# Patient Record
Sex: MALE | Race: WHITE | URBAN - METROPOLITAN AREA
[De-identification: names, ages, dates, MRNs, and addresses within clinical notes are randomized per-mention and may not be internally consistent; named-entity substitution may affect disease eponyms.]

---

## 2023-07-06 ENCOUNTER — OFFICE VISIT (OUTPATIENT)
Dept: FAMILY MEDICINE CLINIC | Facility: CLINIC | Age: 28
End: 2023-07-06

## 2023-07-06 VITALS
WEIGHT: 169.38 LBS | SYSTOLIC BLOOD PRESSURE: 102 MMHG | HEART RATE: 77 BPM | DIASTOLIC BLOOD PRESSURE: 62 MMHG | HEIGHT: 70 IN | BODY MASS INDEX: 24.25 KG/M2

## 2023-07-06 DIAGNOSIS — Z00.00 ANNUAL PHYSICAL EXAM: Primary | ICD-10-CM

## 2023-07-06 DIAGNOSIS — Z13.1 DIABETES MELLITUS SCREENING: ICD-10-CM

## 2023-07-06 DIAGNOSIS — L84 CALLUS OF FOOT: ICD-10-CM

## 2023-07-06 DIAGNOSIS — Z30.09 VASECTOMY EVALUATION: ICD-10-CM

## 2023-07-06 DIAGNOSIS — T78.40XA ALLERGIC REACTION, INITIAL ENCOUNTER: ICD-10-CM

## 2023-07-06 DIAGNOSIS — Z13.220 LIPID SCREENING: ICD-10-CM

## 2023-07-06 PROCEDURE — 3008F BODY MASS INDEX DOCD: CPT | Performed by: FAMILY MEDICINE

## 2023-07-06 PROCEDURE — 99203 OFFICE O/P NEW LOW 30 MIN: CPT | Performed by: FAMILY MEDICINE

## 2023-07-06 PROCEDURE — 99385 PREV VISIT NEW AGE 18-39: CPT | Performed by: FAMILY MEDICINE

## 2023-07-06 PROCEDURE — 3074F SYST BP LT 130 MM HG: CPT | Performed by: FAMILY MEDICINE

## 2023-07-06 PROCEDURE — 3078F DIAST BP <80 MM HG: CPT | Performed by: FAMILY MEDICINE

## 2023-07-06 RX ORDER — ALBUTEROL SULFATE 1.25 MG/3ML
1 SOLUTION RESPIRATORY (INHALATION) EVERY 6 HOURS PRN
COMMUNITY

## 2023-07-08 ENCOUNTER — LAB ENCOUNTER (OUTPATIENT)
Dept: LAB | Age: 28
End: 2023-07-08
Attending: FAMILY MEDICINE
Payer: COMMERCIAL

## 2023-07-08 DIAGNOSIS — Z13.1 DIABETES MELLITUS SCREENING: ICD-10-CM

## 2023-07-08 DIAGNOSIS — Z13.220 LIPID SCREENING: ICD-10-CM

## 2023-07-08 LAB
ALBUMIN SERPL-MCNC: 4.1 G/DL (ref 3.4–5)
ALBUMIN/GLOB SERPL: 1.2 {RATIO} (ref 1–2)
ALP LIVER SERPL-CCNC: 85 U/L
ALT SERPL-CCNC: 30 U/L
ANION GAP SERPL CALC-SCNC: 8 MMOL/L (ref 0–18)
AST SERPL-CCNC: 22 U/L (ref 15–37)
BILIRUB SERPL-MCNC: 0.4 MG/DL (ref 0.1–2)
BUN BLD-MCNC: 21 MG/DL (ref 7–18)
BUN/CREAT SERPL: 20 (ref 10–20)
CALCIUM BLD-MCNC: 8.9 MG/DL (ref 8.5–10.1)
CHLORIDE SERPL-SCNC: 107 MMOL/L (ref 98–112)
CHOLEST SERPL-MCNC: 190 MG/DL (ref ?–200)
CO2 SERPL-SCNC: 25 MMOL/L (ref 21–32)
CREAT BLD-MCNC: 1.05 MG/DL
EST. AVERAGE GLUCOSE BLD GHB EST-MCNC: 111 MG/DL (ref 68–126)
FASTING PATIENT LIPID ANSWER: YES
FASTING STATUS PATIENT QL REPORTED: YES
GFR SERPLBLD BASED ON 1.73 SQ M-ARVRAT: 99 ML/MIN/1.73M2 (ref 60–?)
GLOBULIN PLAS-MCNC: 3.4 G/DL (ref 2.8–4.4)
GLUCOSE BLD-MCNC: 93 MG/DL (ref 70–99)
HBA1C MFR BLD: 5.5 % (ref ?–5.7)
HDLC SERPL-MCNC: 64 MG/DL (ref 40–59)
LDLC SERPL CALC-MCNC: 109 MG/DL (ref ?–100)
NONHDLC SERPL-MCNC: 126 MG/DL (ref ?–130)
OSMOLALITY SERPL CALC.SUM OF ELEC: 293 MOSM/KG (ref 275–295)
POTASSIUM SERPL-SCNC: 4 MMOL/L (ref 3.5–5.1)
PROT SERPL-MCNC: 7.5 G/DL (ref 6.4–8.2)
SODIUM SERPL-SCNC: 140 MMOL/L (ref 136–145)
TRIGL SERPL-MCNC: 93 MG/DL (ref 30–149)
VLDLC SERPL CALC-MCNC: 16 MG/DL (ref 0–30)

## 2023-07-08 PROCEDURE — 36415 COLL VENOUS BLD VENIPUNCTURE: CPT

## 2023-07-08 PROCEDURE — 80053 COMPREHEN METABOLIC PANEL: CPT

## 2023-07-08 PROCEDURE — 83036 HEMOGLOBIN GLYCOSYLATED A1C: CPT

## 2023-07-08 PROCEDURE — 80061 LIPID PANEL: CPT

## 2023-08-09 ENCOUNTER — OFFICE VISIT (OUTPATIENT)
Dept: SURGERY | Facility: CLINIC | Age: 28
End: 2023-08-09

## 2023-08-09 DIAGNOSIS — Z30.09 FAMILY PLANNING: Primary | ICD-10-CM

## 2023-08-09 PROCEDURE — 99203 OFFICE O/P NEW LOW 30 MIN: CPT | Performed by: UROLOGY

## 2023-08-09 NOTE — PROGRESS NOTES
Conrado Severs, MD  Department of Urology  [facility], [place]    T: [phone]  F: [phone]    To: Masood Apple DO   [facility] [address] [id number]    Re: [place]   MRN: [id number]  : 5/10/1995    Dear Masood Apple DO,    Today I had the pleasure of seeing [place] in my clinic. As you know, Mr. Deneen Grant is a pleasant 33year old year old male who I am seeing for vasectomy cosult. Patient was last seen in this department on Visit date not found. Briefly, patient is engaged with 2 twin boys. He and his partner desire vasectomy as a means of contraception       PAST MEDICAL HISTORY:  Past Medical History:   Diagnosis Date    Allergic rhinitis Childhood    Asthma Childhood        PAST SURGICAL HISTORY:  Past Surgical History:   Procedure Laterality Date    OTHER SURGICAL HISTORY  6-8 years ago    Torn labrum on left shoulder. Also torn flexur tendon in left wrist a ear or 2 prior to labrum surgery.     TONSILLECTOMY  Childhood         ALLERGIES:    Fish-Derived Produc*    SHORTNESS OF BREATH  Mold                    RESPIRATORY FAILURE  Shellfish               OTHER (SEE COMMENTS)    Comment:Difficulty breathing      MEDICATIONS:  Current Outpatient Medications   Medication Instructions    Albuterol Sulfate 1.25 MG/3ML Inhalation Nebu Soln 1 ampule, Nebulization, Every 6 hours PRN        FAMILY HISTORY:  Family History   Problem Relation Age of Onset    Asthma Father     Colon Cancer Neg     Prostate Cancer Neg         SOCIAL HISTORY:  Social History     Socioeconomic History    Marital status: Unknown   Tobacco Use    Smoking status: Former     Packs/day: 0.50     Years: 6.00     Pack years: 3.00     Types: Cigarettes     Quit date: 2021     Years since quittin.7     Passive exposure: Past    Smokeless tobacco: Never    Tobacco comments:     Chews nicotine pouches    Vaping Use    Vaping Use: Never used Substance and Sexual Activity    Alcohol use: Yes     Alcohol/week: 12.0 standard drinks of alcohol     Types: 12 Cans of beer per week    Drug use: Not Currently     Frequency: 0.5 times per week     Types: Cannabis    Sexual activity: Yes     Partners: Female          PHYSICAL EXAMINATION:  There were no vitals filed for this visit. CONSTITUTIONAL: No apparent distress, cooperative and communicative  NEUROLOGIC: Alert and oriented   HEAD: Normocephalic, atraumatic   EYES: Sclera non-icteric   ENT: Hearing intact, moist mucous membranes   NECK: No obvious goiter or masses   RESPIRATORY: Normal respiratory effort, Nonlabored breathing on room air  SKIN: No evident rashes   ABDOMEN: Soft, nontender, nondistended, no rebound tenderness, no guarding, no masses  GENITOURINARY: Bilateral vasa palpated  DIGITAL RECTAL EXAM: Did not perform    REVIEW OF SYSTEMS:    A comprehensive 10-point review of systems was completed. Pertinent positives and negatives are noted in the the HPI. LABORATORY DATA:    none        IMAGING REVIEW:  none     OTHER RELEVANT DATA:   none     IMPRESSION: Vasectomy, proceed    We discussed that a vasectomy is intended to be a permanent form of contraception and that if he desires fertility after vasectomy, options included vasectomy reversal and sperm retrieval with possible in vitro fertilization. These options are not always successful and may be very expensive. We also discussed the risks of vasectomy which included symptomatic hematoma and infection (1-2%), chronic scrotal pain (6%; caused by congestive epididymitis, sperm granuloma and infective epididymoorchitis) need for repeat vasectomy (<1% of cases), need for additional procedures, vasectomy failure, and pregnancy (1 in 2000 men). The chronic scrotal discomfort may be no more than a low-grade chronic ache that causes little disability and requires only symptomatic treatment.  However, a proportion of patients will be sufficiently debilitated to seek epididymectomy or even orchidectomy (removal of the epididymis and or testicle). Furthermore, for a very small number of patients, even this radical surgery will not provide relief from their scrotal discomfort. We discussed that he will have two small incisions in his scrotum with dissolvable sutures. He was told that vasectomy does NOT produce immediate sterility and that following vasectomy, another form of contraception is required until vas occlusion is confirmed by post vasectomy semen analysis. Post vasectomy semenanalysis will be obtained 12-16 weeks after the vasectomy. He was told that he may stop using other methods of contraception when examination of one well-mixed, uncentrifuged, fresh post-vasectomy semen specimen shows azoospermia or only rare non-motile sperm. He was once again told that even after vas occlusion is confirmed, vasectomy is not 100% reliable in preventing pregnancy and that the risk of pregnancy after vasectomy is approximately 1 in 2,000 men who have post-vasectomy azoospermia or semenanalysis showing rare non-motile sperm. The reasons are very early recanalization of the vas deferens or the presence of an accessory vas unrecognized at the time of surgery. There have also been cases of DNA-confirmed paternity despite documented azoospermia before and after conception. He was told to refrain from ejaculation for approximately one week after vasectomy. If there are >100,000 non-motile sperm/mL after 6 months post vasectomy, we would trend the semenanalysis for consideration of repeat vasectomy. PLAN:  Vasectomy, proceed    Thank you for referring this very pleasant patient to my clinic. If you have any questions or concerns, please do not hesitate to contact me.     Sincerely,  Dionicio Riley MD    30 minutes were spent on this patient at this visit obtaining a history, reviewing medical records, developing a treatment plan, counseling and discussing treatment strategy with patient, coordination of care and documentation. The [id number] makes medical notes available to patients in the interest of transparency. However, please be advised that this is a medical document. It is intended as a peer to peer communication. It is written in medical language and may contain abbreviations or verbiage that are technical and unfamiliar. It may appear blunt or direct. Medical documents are intended to carry relevant information, facts as evident, and the clinical opinion of the practitioner.

## 2023-09-20 ENCOUNTER — PROCEDURE (OUTPATIENT)
Dept: SURGERY | Facility: CLINIC | Age: 28
End: 2023-09-20

## 2023-09-20 VITALS — HEART RATE: 93 BPM | DIASTOLIC BLOOD PRESSURE: 78 MMHG | SYSTOLIC BLOOD PRESSURE: 116 MMHG

## 2023-09-20 DIAGNOSIS — Z30.2 ENCOUNTER FOR VASECTOMY: Primary | ICD-10-CM

## 2023-09-20 PROCEDURE — 3074F SYST BP LT 130 MM HG: CPT | Performed by: UROLOGY

## 2023-09-20 PROCEDURE — 3078F DIAST BP <80 MM HG: CPT | Performed by: UROLOGY

## 2023-09-20 PROCEDURE — 55250 REMOVAL OF SPERM DUCT(S): CPT | Performed by: UROLOGY

## 2023-09-20 NOTE — PROCEDURES
BILATERAL VASECTOMY     PRE-OP DIAGNOSIS: Family Planning/Desires Male Sterility     POST-OP DIAGNOSIS: Same     PROCEDURE:  1. Bilateral vasectomy     SURGEON: Paulina Saucedo MD     ASSISTANT: none     EBL: minimal     ANESTHESIA: Local, Lidocaine 1%, 10cc     SPECIMENS:  1. RIGHT segment of vas  2. LEFT segment of vas     INDICATIONS: 33year-old   man with  children who desires sterility with bilateral vasectomy. We discussed that a vasectomy is intended to be a permanent form of contraception and that if he desires fertility after vasectomy, options included vasectomy reversal and sperm retrieval with possible in vitro fertilization. These options are not always successful and may be very expensive. We also discussed the risks of vasectomy which included symptomatic hematoma and infection (1-2%), chronic scrotal pain (6%; caused by congestive epididymitis, sperm granuloma and infective epididymoorchitis) need for repeat vasectomy (<1% of cases), need for additional procedures, vasectomy failure, and pregnancy (1 in 2000 men). The chronic scrotal discomfort may be no more than a low-grade chronic ache that causes little disability and requires only symptomatic treatment. However, a proportion of patients will be sufficiently debilitated to seek epididymectomy or even orchidectomy (removal of the epididymis and or testicle). Furthermore, for a very small number of patients, even this radical surgery will not provide relief from their scrotal discomfort. We discussed that he will have two small incisions in his scrotum with dissolvable sutures. He was told that vasectomy does NOT produce immediate sterility and that following vasectomy, another form of contraception is required until vas occlusion is confirmed by post vasectomy semen analysis. Post vasectomy semenanalysis will be obtained 12-16 weeks after the vasectomy.  He was told that he may stop using other methods of contraception when examination of one well-mixed, uncentrifuged, fresh post-vasectomy semen specimen shows azoospermia or only rare non-motile sperm. He was once again told that even after vas occlusion is confirmed, vasectomy is not 100% reliable in preventing pregnancy and that the risk of pregnancy after vasectomy is approximately 1 in 2,000 men who have post-vasectomy azoospermia or semenanalysis showing rare non-motile sperm. The reasons are very early recanalization of the vas deferens or the presence of an accessory vas unrecognized at the time of surgery. There have also been cases of DNA-confirmed paternity despite documented azoospermia before and after conception. He was told to refrain from ejaculation for approximately one week after vasectomy. If there are >100,000 non-motile sperm/mL after 6 months post vasectomy, we would trend the semenanalysis for consideration of repeat vasectomy. PROCEDURE: After the appropriate time out checklist was performed, and it was confirmed that the patient was in fact the patient here for vasectomy, the scrotum was prepped and draped in an appropriate fashion. The right vas was then grasped and brought up underneath the skin surface. The skin was injected with lidocaine to make a small wheel. We then anesthestized the skin, dartose muscle and the vasal sheath with 5cc of lidocaine 1%. After an appropriate time for that to set, we used a 15 blade scalpel to make a small incision overlying the vas in the right hemiscrotum. A snap was used to spread the tissue overlying the vas to make room for the vas ring forceps. The ring forceps was used to grasp the vas deferens to hold it in place. We used the 15 blade to cut away at the vasal sheath and used towel clamps to just grasp the vas deferens. Once we were able to isolate just the vas deferens, we used a small mosquito clamp to push down the vasal sheath.  Two snaps were applied to the vas - one at the testicular end and another at the abdominal end. The vas was transected over the snaps and a 1-2cm portion of vas was removed. The needle tip electrocautery was inserted first nenita the abdominal end and then into the testicular end for about 1.5cm. By slowly pulling back and firing it, you could see that the mucosa was being cauterized. This was not a full-thickness cauterization, but only a mucosal cauterization. Clips were placed on the testicular and abdominal ends of the vas stumps. There was no bleeding seen from the vasal stumps, and they were dropped back into the left hemiscrotum. The dartos muscle and skin were cauterized. An exactly similar procedure was performed on the LEFT side. Again, mucosal cauterization was undertaken. No bleeding was seen. Again, closure was the same. The skin was approximated with 3-0 chromic sutures in a horizontal mattress fashion. Fluffs gauze and scrotal support were placed. DISPOSITION: home     FOLLOW-UP: Post vasectomy semen analysis 8-16 weeks after vasectomy. Patient knows to use another form of contraception until vasal occlusion is confirmed by post-vasectomy semen analysis.

## 2023-10-04 ENCOUNTER — OFFICE VISIT (OUTPATIENT)
Dept: ALLERGY | Facility: CLINIC | Age: 28
End: 2023-10-04

## 2023-10-04 ENCOUNTER — NURSE ONLY (OUTPATIENT)
Dept: ALLERGY | Facility: CLINIC | Age: 28
End: 2023-10-04

## 2023-10-04 VITALS
BODY MASS INDEX: 24.2 KG/M2 | WEIGHT: 169 LBS | DIASTOLIC BLOOD PRESSURE: 73 MMHG | OXYGEN SATURATION: 98 % | HEART RATE: 86 BPM | SYSTOLIC BLOOD PRESSURE: 118 MMHG | HEIGHT: 70 IN

## 2023-10-04 DIAGNOSIS — J30.89 SEASONAL AND PERENNIAL ALLERGIC RHINOCONJUNCTIVITIS: Primary | ICD-10-CM

## 2023-10-04 DIAGNOSIS — J30.2 SEASONAL AND PERENNIAL ALLERGIC RHINOCONJUNCTIVITIS: Primary | ICD-10-CM

## 2023-10-04 DIAGNOSIS — J45.20 MILD INTERMITTENT EXTRINSIC ASTHMA WITHOUT COMPLICATION: ICD-10-CM

## 2023-10-04 DIAGNOSIS — Z92.29 COVID-19 VACCINE SERIES COMPLETED: ICD-10-CM

## 2023-10-04 DIAGNOSIS — J30.89 ENVIRONMENTAL AND SEASONAL ALLERGIES: Primary | ICD-10-CM

## 2023-10-04 DIAGNOSIS — Z91.018 FOOD ALLERGY: ICD-10-CM

## 2023-10-04 DIAGNOSIS — Z23 FLU VACCINE NEED: ICD-10-CM

## 2023-10-04 DIAGNOSIS — H10.10 SEASONAL AND PERENNIAL ALLERGIC RHINOCONJUNCTIVITIS: Primary | ICD-10-CM

## 2023-10-04 PROCEDURE — 95024 IQ TESTS W/ALLERGENIC XTRCS: CPT | Performed by: ALLERGY & IMMUNOLOGY

## 2023-10-04 PROCEDURE — 3074F SYST BP LT 130 MM HG: CPT | Performed by: ALLERGY & IMMUNOLOGY

## 2023-10-04 PROCEDURE — 3008F BODY MASS INDEX DOCD: CPT | Performed by: ALLERGY & IMMUNOLOGY

## 2023-10-04 PROCEDURE — 95004 PERQ TESTS W/ALRGNC XTRCS: CPT | Performed by: ALLERGY & IMMUNOLOGY

## 2023-10-04 PROCEDURE — 3078F DIAST BP <80 MM HG: CPT | Performed by: ALLERGY & IMMUNOLOGY

## 2023-10-04 PROCEDURE — 99204 OFFICE O/P NEW MOD 45 MIN: CPT | Performed by: ALLERGY & IMMUNOLOGY

## 2023-10-04 RX ORDER — ALBUTEROL SULFATE 90 UG/1
2 AEROSOL, METERED RESPIRATORY (INHALATION) EVERY 6 HOURS PRN
Qty: 1 EACH | Refills: 0 | Status: SHIPPED | OUTPATIENT
Start: 2023-10-04

## 2023-10-04 RX ORDER — EPINEPHRINE 0.3 MG/.3ML
INJECTION SUBCUTANEOUS
Qty: 1 EACH | Refills: 0 | Status: SHIPPED | OUTPATIENT
Start: 2023-10-04

## 2023-10-04 RX ORDER — LEVOCETIRIZINE DIHYDROCHLORIDE 5 MG/1
5 TABLET, FILM COATED ORAL EVERY EVENING
Qty: 90 TABLET | Refills: 1 | Status: SHIPPED | OUTPATIENT
Start: 2023-10-04

## 2023-10-04 RX ORDER — MONTELUKAST SODIUM 10 MG/1
10 TABLET ORAL
COMMUNITY

## 2023-10-04 NOTE — PATIENT INSTRUCTIONS
#1 allergic rhinitis  See above skin testing to screen for allergic triggers  Reviewed avoidance measures and potential treatment options immunotherapy  Trial of Xyzal, levocetirizine 5 mg once a day up to twice a day if needed if having significant runny nose sneezing itchy watery eyes  May add Flonase or Nasacort 2 sprays per nostril once a day if having prominent nasal congestion or postnasal drip      #2 asthma  Appears mild intermittent by history. No ED visits or prednisone in the past year. Denies symptoms more than 2 days/week  Albuterol 2 puffs every 4-6 hours if having active coughing wheezing or shortness of breath    #3 Food allergies  History of seafood allergy  See above skin testing to finned fish and shellfish to further evaluate  Recommend avoid those foods are positive on skin testing  May consider oral challenge those foods are negative on skin testing  EpiPen and Benadryl as needed based on symptom severity and event of allergic reaction  EpiPen prescription sent and teaching provided    #4 COVID vaccines reviewed. Recommend booster this fall    #5 flu vaccine recommended this fall     Orders This Visit:  No orders of the defined types were placed in this encounter. Meds This Visit:  Requested Prescriptions     Signed Prescriptions Disp Refills    levocetirizine 5 MG Oral Tab 90 tablet 1     Sig: Take 1 tablet (5 mg total) by mouth every evening.     EPINEPHrine (EPIPEN 2-ARMEN) 0.3 MG/0.3ML Injection Solution Auto-injector 1 each 0     Sig: Inject IM in event of  allergic reaction

## 2023-10-04 NOTE — PROGRESS NOTES
Kevin Lang is a 33year old male. HPI:   Patient presents with: Allergies: Patient states this time of year,in the spring and/or fall gets a puffy face, has breathing issues - has asthma issues, states he has been in the ER twice in the past 3-4 years. Patient is a 78-year-old male who presents for allergy consultation upon referral of his PCP with a chief complaint of allergies referral from July 6, 2023 reviewed and appreciated. PCP notes history of allergic rhinitis that can worsen in the spring and fall. Above nursing notes reviewed and appreciated    Today patient reports      Allergies   Duration:  Timing: spring and fall   Symptoms: face feels like plastic red,  limbs feels heavy, loss of strengths, sob   Freq 1x/year   No gen hives  + rn, sz ,Ie we   Severity: moderate   Status:  no change   Triggers: allergies   Tried:benadryl negative besides Benadryl, pred   Pets or smokers:  none   Former smoker of cigarettes. Quit in 2021    Hx of asthma, ad, or food allergy:    History of asthma. Denies current asthma symptoms or asthma symptoms more than 2 days/week. No ED visits or prednisone in the past year. No history of intubations  Exercise induced     History of seafood allergy. Request for testing to further evaluate. Child rash       COVID-vaccine x2 doses last in August 2021    HISTORY:  Past Medical History:   Diagnosis Date    Allergic rhinitis Childhood    Asthma Childhood      Past Surgical History:   Procedure Laterality Date    OTHER SURGICAL HISTORY  6-8 years ago    Torn labrum on left shoulder. Also torn flexur tendon in left wrist a ear or 2 prior to labrum surgery.     TONSILLECTOMY  Childhood      Family History   Problem Relation Age of Onset    Asthma Father     Allergies Father     Colon Cancer Neg     Prostate Cancer Neg       Social History:   Social History     Socioeconomic History    Marital status: Unknown   Tobacco Use    Smoking status: Former     Packs/day: 0.50     Years: 6.00     Additional pack years: 0.00     Total pack years: 3.00     Types: Cigarettes     Quit date: 2021     Years since quittin.8     Passive exposure: Past    Smokeless tobacco: Never    Tobacco comments:     Chews nicotine pouches    Vaping Use    Vaping Use: Never used   Substance and Sexual Activity    Alcohol use: Yes     Alcohol/week: 12.0 standard drinks of alcohol     Types: 12 Cans of beer per week    Drug use: Not Currently     Frequency: 0.5 times per week     Types: Cannabis    Sexual activity: Yes     Partners: Female        Medications (Active prior to today's visit):  Current Outpatient Medications   Medication Sig Dispense Refill    levocetirizine 5 MG Oral Tab Take 1 tablet (5 mg total) by mouth every evening. 90 tablet 1    EPINEPHrine (EPIPEN 2-ARMEN) 0.3 MG/0.3ML Injection Solution Auto-injector Inject IM in event of  allergic reaction 1 each 0    albuterol (PROAIR HFA) 108 (90 Base) MCG/ACT Inhalation Aero Soln Inhale 2 puffs into the lungs every 6 (six) hours as needed for Wheezing. 1 each 0    montelukast 10 MG Oral Tab Take 1 tablet (10 mg total) by mouth daily as needed. (Patient not taking: Reported on 10/4/2023)      Albuterol Sulfate 1.25 MG/3ML Inhalation Nebu Soln Take 3 mL (1.25 mg total) by nebulization every 6 (six) hours as needed for Wheezing.  (Patient not taking: Reported on 10/4/2023)         Allergies:    Fish-Derived Produc*    SHORTNESS OF BREATH  Mold                    RESPIRATORY FAILURE  Shellfish               OTHER (SEE COMMENTS)    Comment:Difficulty breathing      ROS:     Allergic/Immuno:  See HPI  Cardiovascular:  Negative for irregular heartbeat/palpitations, chest pain, edema  Constitutional:  Negative night sweats,weight loss, irritability and lethargy  Endocrine:  Negative for cold intolerance, polydipsia and polyphagia  ENMT:  Negative for ear drainage, hearing loss  see hpi   Eyes:  Negative for eye discharge and vision loss  Gastrointestinal:  Negative for abdominal pain, diarrhea and vomiting  Genitourinary:  Negative for dysuria and hematuria  Hema/Lymph:  Negative for easy bleeding and easy bruising  Integumentary:  Negative for pruritus and rash  Musculoskeletal:  Negative for joint symptoms  Neurological:  Negative for dizziness, seizures  Psychiatric:  Negative for inappropriate interaction and psychiatric symptoms  Respiratory:  Negative for cough, dyspnea and wheezing      PHYSICAL EXAM:   Constitutional: responsive, no acute distress noted  Head/Face: NC/Atraumatic  Eyes/Vision: conjunctiva and lids are normal extraocular motion is intact   Ears/Audiometry: tympanic membranes are normal bilaterally hearing is grossly intact  Nose/Mouth/Throat: nose and throat are clear mucous membranes are moist   Neck/Thyroid: neck is supple without adenopathy  Lymphatic: no abnormal cervical, supraclavicular or axillary adenopathy is noted  Respiratory: normal to inspection lungs are clear to auscultation bilaterally normal respiratory effort   Cardiovascular: regular rate and rhythm no murmurs, gallups, or rubs  Abdomen: soft non-tender non-distended  Skin/Hair: no unusual rashes present  Extremities: no edema, cyanosis, or clubbing  Neurological:Oriented to time, place, person & situation       ASSESSMENT/PLAN:   Assessment   Seasonal and perennial allergic rhinoconjunctivitis  (primary encounter diagnosis)  Mild intermittent extrinsic asthma without complication  [id number] vaccine series completed  Flu vaccine need  Food allergy    Skin testing today to common indoor and outdoor environmental allergies was positive to trees grass ragweed weeds mold dust mite cats and dogs    Skin testing today to fish and shellfish panel was negative      #1 allergic rhinitis  See above skin testing to screen for allergic triggers  Reviewed avoidance measures and potential treatment options immunotherapy  Trial of Xyzal, levocetirizine 5 mg once a day up to twice a day if needed if having significant runny nose sneezing itchy watery eyes  May add Flonase or Nasacort 2 sprays per nostril once a day if having prominent nasal congestion or postnasal drip      #2 asthma  Appears mild intermittent by history. No ED visits or prednisone in the past year. Denies symptoms more than 2 days/week  Albuterol 2 puffs every 4-6 hours if having active coughing wheezing or shortness of breath    #3 Food allergies  History of seafood allergy  See above skin testing to finned fish and shellfish to further evaluate  Recommend avoid those foods are positive on skin testing  May consider oral challenge those foods are negative on skin testing  EpiPen and Benadryl as needed based on symptom severity and event of allergic reaction  EpiPen prescription sent and teaching provided    #4 COVID vaccines reviewed. Recommend booster this fall    #5 flu vaccine recommended this fall     Orders This Visit:  No orders of the defined types were placed in this encounter. Meds This Visit:  Requested Prescriptions     Signed Prescriptions Disp Refills    levocetirizine 5 MG Oral Tab 90 tablet 1     Sig: Take 1 tablet (5 mg total) by mouth every evening. EPINEPHrine (EPIPEN 2-ARMEN) 0.3 MG/0.3ML Injection Solution Auto-injector 1 each 0     Sig: Inject IM in event of  allergic reaction    albuterol (PROAIR HFA) 108 (90 Base) MCG/ACT Inhalation Aero Soln 1 each 0     Sig: Inhale 2 puffs into the lungs every 6 (six) hours as needed for Wheezing. Imaging & Referrals:  None     10/4/2023  Julio Harrison MD      If medication samples were provided today, they were provided solely for patient education and training related to self administration of these medications. Teaching, instruction and sample was provided to the patient by myself. Teaching included  a review of potential adverse side effects as well as potential efficacy.   Patient's questions were answered in regards to medication administration and dosing and potential side effects.  Teaching was provided via the teach back method

## 2023-12-02 RX ORDER — ALBUTEROL SULFATE 90 UG/1
2 AEROSOL, METERED RESPIRATORY (INHALATION) EVERY 6 HOURS PRN
Qty: 1 EACH | Refills: 0 | Status: SHIPPED | OUTPATIENT
Start: 2023-12-02

## 2023-12-02 NOTE — TELEPHONE ENCOUNTER
Patient returning nurse phone call   Verified name and   Per patient has had a cold for a little more than a month  Was using albuterol 4-5 times a day last month when cold was at it's worse  Per patient symptoms have mostly resolved - still with slight cough, coughs up phlegm sometimes   Patient is not sure if phlegm is colored  No fever, body aches or chills  Receives good relief after using inhaler   Now is only having to use albuterol inhaler once a week  Is not using inhaler sooner than 4 hours  RN advised that if patient is having to use inhaler sooner than 4 hours or is not receiving good relief to go to ER/urgent care  Patient verbalized understanding  Refill filled per protocol

## 2023-12-02 NOTE — TELEPHONE ENCOUNTER
Left a message for patient to please contact our office to discuss Albuterol inhaler refill. Last office visit was 10/4/23 and last refill was also 10/423. Will need to assess patient's asthma.

## 2023-12-23 ENCOUNTER — LAB ENCOUNTER (OUTPATIENT)
Dept: LAB | Facility: HOSPITAL | Age: 28
End: 2023-12-23
Attending: UROLOGY
Payer: COMMERCIAL

## 2023-12-23 DIAGNOSIS — Z30.2 ENCOUNTER FOR VASECTOMY: ICD-10-CM

## 2023-12-23 PROCEDURE — 89321 SEMEN ANAL SPERM DETECTION: CPT

## 2024-02-05 ENCOUNTER — TELEPHONE (OUTPATIENT)
Dept: CASE MANAGEMENT | Age: 29
End: 2024-02-05

## 2024-02-05 DIAGNOSIS — S05.00XA CORNEAL ABRASION, UNSPECIFIED LATERALITY, INITIAL ENCOUNTER: Primary | ICD-10-CM

## 2024-02-05 NOTE — TELEPHONE ENCOUNTER
Dr. Hernandez,     Patient called requesting referral to an [facility] ophthalmologist for scratched cornea.     Pended referral please review diagnosis and sign off if you agree.    Thank you.  Maggie Fletcher  [facility]

## 2024-02-12 ENCOUNTER — DOCUMENTATION ONLY (OUTPATIENT)
Dept: FAMILY MEDICINE CLINIC | Facility: CLINIC | Age: 29
End: 2024-02-12

## 2024-02-12 ENCOUNTER — MED REC SCAN ONLY (OUTPATIENT)
Dept: FAMILY MEDICINE CLINIC | Facility: CLINIC | Age: 29
End: 2024-02-12

## 2024-12-27 NOTE — PROGRESS NOTES
HPI:    Patient ID: Franco Esqueda is a 29 year old male.    HPI  Pt presenting for routine physical exam. Denies any acute issues or recent illnesses. No significant chronic medical problems. Past medical/surgical history, family history, and social history were reviewed.     Notes lump on Left medial wrist  Frequent numbness/tingling in fingers  No weakness    Trying to work on wellness and optimal performance  Plans to increase protein intake to goal 115g daily  Cold rolled oats QAM  Yogurt, jam  Otherwise balanced diet  Grain, +/- veg  Started fish oil, creatinine    Twins 2yo sons  Franco nogueira  [employer]    Mood stable, denies SH/SI/HI      Review of Systems   A comprehensive 10 point review of systems was completed.  Pertinent positives and negatives noted in the the HPI.       Current Outpatient Medications   Medication Sig Dispense Refill    Omega-3 Fatty Acids (FISH OIL OR) Take by mouth.      Creatine Does not apply Powder       WELLNESS PROTEIN SHAKE OR Take by mouth.      fluticasone-salmeterol 115-21 MCG/ACT Inhalation Aerosol Inhale 1 puff into the lungs 2 (two) times daily. 1 each 3    EPINEPHrine (EPIPEN 2-ARMEN) 0.3 MG/0.3ML Injection Solution Auto-injector Inject IM in event of  allergic reaction 1 each 0    ALPRAZolam (XANAX) 0.25 MG Oral Tab Take 1 tablet (0.25 mg total) by mouth nightly as needed for Anxiety or Sleep. Can cause sedation/ fatigue. 30 tablet 0    albuterol (PROAIR HFA) 108 (90 Base) MCG/ACT Inhalation Aero Soln Inhale 2 puffs into the lungs every 6 (six) hours as needed for Wheezing. 1 each 0    levocetirizine 5 MG Oral Tab Take 1 tablet (5 mg total) by mouth every evening. 90 tablet 1    montelukast 10 MG Oral Tab Take 1 tablet (10 mg total) by mouth daily as needed. (Patient not taking: Reported on 12/27/2024)       Allergies:Allergies[1]   Vitals:    12/27/24 1416   BP: 123/79   Pulse: 83   Weight: 156 lb 12.8 oz (71.1 kg)   Height: 5' 10\" (1.778 m)       Body mass index  is 22.5 kg/m².   PHYSICAL EXAM:   Physical Exam  Vitals reviewed.   Constitutional:       General: He is not in acute distress.     Appearance: Normal appearance.   HENT:      Head: Normocephalic and atraumatic.      Right Ear: External ear normal. There is impacted cerumen.      Left Ear: External ear normal. There is impacted cerumen.   Eyes:      Conjunctiva/sclera: Conjunctivae normal.   Cardiovascular:      Rate and Rhythm: Normal rate and regular rhythm.      Heart sounds: Normal heart sounds, S1 normal and S2 normal. No murmur heard.  Pulmonary:      Effort: Pulmonary effort is normal. No respiratory distress.      Breath sounds: Normal breath sounds. No wheezing, rhonchi or rales.   Abdominal:      General: Bowel sounds are normal.      Palpations: Abdomen is soft.      Tenderness: There is no abdominal tenderness. There is no guarding or rebound.   Musculoskeletal:      Cervical back: Normal range of motion and neck supple. No muscular tenderness.      Right lower leg: No edema.      Left lower leg: No edema.   Lymphadenopathy:      Cervical: No cervical adenopathy.   Skin:     General: Skin is warm.      Coloration: Skin is not jaundiced.   Neurological:      General: No focal deficit present.      Mental Status: He is alert and oriented to person, place, and time. Mental status is at baseline.   Psychiatric:         Attention and Perception: Attention normal.         Mood and Affect: Mood normal.         Behavior: Behavior normal. Behavior is cooperative.         Cognition and Memory: Cognition normal.             ASSESSMENT/PLAN:   1. Well adult exam  Discussed preventative screenings  - will check labs as below  - encouraged to continue diet/exercise for overall wellness  - follow-up with eye doctor annually  - follow-up with dentist every 6 months  - return yearly for physicals  - annual flu shot  - tetanus booster every 10yrs  - TSH W Reflex To Free T4; Future  - Comp Metabolic Panel (14); Future  -  Lipid Panel; Future  - Urinalysis, Routine; Future  - CBC With Differential With Platelet; Future    2. Ganglion cyst of wrist, left  Encouraged to follow-up with Ortho for treatment  - Ortho Referral - In Network    3. Impacted cerumen of both ears  Following use of curette, I was able to fully visualize both TMs, both of which appeared clear without signs of infection. Pt tolerated procedure without complication, reported immediate improvement in hearing bilaterally. 3 mins spent.    4. Mild intermittent asthma without complication (HCC)  Improved, continue regimen  - fluticasone-salmeterol 115-21 MCG/ACT Inhalation Aerosol; Inhale 1 puff into the lungs 2 (two) times daily.  Dispense: 1 each; Refill: 3    Pt verbalized understanding and agrees with plan.    Orders Placed This Encounter   Procedures    TSH W Reflex To Free T4    Comp Metabolic Panel (14)    Lipid Panel    Urinalysis, Routine    CBC With Differential With Platelet       Meds This Visit:  Requested Prescriptions     Signed Prescriptions Disp Refills    fluticasone-salmeterol 115-21 MCG/ACT Inhalation Aerosol 1 each 3     Sig: Inhale 1 puff into the lungs 2 (two) times daily.       Imaging & Referrals:  ORTHOPEDIC - INTERNAL         ID#[id number]         [1]   Allergies  Allergen Reactions    Dust Mite Extract HIVES     Unknown environmental substance-treated in ED for hives    Fish-Derived Products SHORTNESS OF BREATH    Mold RESPIRATORY FAILURE    Shellfish-Derived Products HIVES    Shellfish OTHER (SEE COMMENTS)     Difficulty breathing

## 2025-01-08 NOTE — TELEPHONE ENCOUNTER
Please kindly review; protocol failed or medication has no protocol attached.   Medication request is marked high priority: patient is out of medication    No future appointments.  Last office visit: 12/27/24    Recent fill dates: No dispense history in the last 6 months.  Date of  last written prescription: 6/6/24   Last written quantity: #30 each with 0 additional refills  [x] Takes as needed  [] Takes scheduled daily    Requested Prescriptions   Pending Prescriptions Disp Refills    ALPRAZOLAM 0.25 MG Oral Tab [Pharmacy Med Name: ALPRAZOLAM 0.25MG TABLETS] 30 tablet 0     Sig: TAKE 1 TABLET(0.25 MG) BY MOUTH EVERY NIGHT AS NEEDED FOR ANXIETY OR SLEEP. MAY CAUSE SEDATION/ FATIGUE       Controlled Substance Medication Failed - 1/8/2025  3:26 PM        Failed - This medication is a controlled substance - forward to provider to refill        Passed - Medication is active on med list               Recent Outpatient Visits              1 week ago Well adult exam    [facility], [facility], [place], Karolyn Bishop MD    Office Visit    7 months ago [facility]ment disorder with mixed anxiety and depressed [facility]    [facility]    1 year ago [facility] and [facility]    [facility]    1 year ago Seasonal and perennial allergic rhinoconjunctivitis    [facility]    [facility]    1 year ago [facility]    [facility] Visit

## 2025-01-10 NOTE — TELEPHONE ENCOUNTER
Patient has an appointment scheduled with Dr. Irene on 2/26/25 for left wrist ganglion cyst. Please advise if imaging is needed.

## 2025-02-12 NOTE — H&P
Clinic Note       Assessment/Plan:  29 year old male    Left carpal tunnel syndrome-eval with EMG/NCV  Left FCR tenosynovitis versus tendon sheath cyst-previous surgery has been performed by Dr. Pereira a tenosynovectomy and legend intraoperative finding of a ganglion cyst?  Which was noted to be benign.  Further evaluate and characterize the integrity of the FCR tendon as well as the origin of the cyst  Nicotine dependence     Follow Up: After EMG/NCV and MRI    Diagnostic Studies:     EMG/NCV: Pending  MRI left wrist: Pending     Physical Exam:     Ht 5' 10\" (1.778 m)   Wt 156 lb (70.8 kg)   BMI 22.38 kg/m²     Left Upper Extremity:     Inspection    Skin intact. No joint effusion.  Swelling mass along the FCR tendon at the distal forearm wrist level Palpation    Tenderness to palpation along the FCR tendon over the cystlike structure      ROM    Full composite fist. Normal wrist/elbow motion.     Median Nerve Exam   Phdurkan +   Sensation normal   PCBr Sensation normal   APB Weakness No   Thenar Atrophy No   Ulnar Nerve Exam   Tinels neg   EF neg   Hypermobility  neg   Sensation normal   1st APARNA Weakness No   SF Escape No   Hypothenar Atrophy No   Radial Nerve Exam    Radial Sensory Normal        CC: Left wrist bump    HPI: This 29 year old RHD male presents with left wrist lump.  Patient notes pain and loss of sensation in the median nerve distribution.  Patient reports pain can be severe at times.  He describes the pain as sharp and shooting along the FCR tendon.    Occupation: [employer]    Past Medical History:    Allergic rhinitis    Asthma (HCC)     Past Surgical History:   Procedure Laterality Date    Other surgical history  6-8 years ago    Torn labrum on left shoulder. Also torn flexur tendon in left wrist a ear or 2 prior to labrum surgery.    Tonsillectomy  Childhood     Current Outpatient Medications   Medication Sig Dispense Refill    ALPRAZolam 0.25 MG Oral Tab TAKE 1 TABLET(0.25 MG) BY MOUTH  EVERY NIGHT AS NEEDED FOR ANXIETY OR SLEEP. MAY CAUSE SEDATION/ FATIGUE 30 tablet 0    Omega-3 Fatty Acids (FISH OIL OR) Take by mouth.      Creatine Does not apply Powder       WELLNESS PROTEIN SHAKE OR Take by mouth.      fluticasone-salmeterol 115-21 MCG/ACT Inhalation Aerosol Inhale 1 puff into the lungs 2 (two) times daily. 1 each 3    EPINEPHrine (EPIPEN 2-ARMEN) 0.3 MG/0.3ML Injection Solution Auto-injector Inject IM in event of  allergic reaction 1 each 0    albuterol (PROAIR HFA) 108 (90 Base) MCG/ACT Inhalation Aero Soln Inhale 2 puffs into the lungs every 6 (six) hours as needed for Wheezing. 1 each 0    levocetirizine 5 MG Oral Tab Take 1 tablet (5 mg total) by mouth every evening. 90 tablet 1    montelukast 10 MG Oral Tab Take 1 tablet (10 mg total) by mouth daily as needed. (Patient not taking: Reported on 10/4/2023)       Allergies[1]  Family History   Problem Relation Age of Onset    Asthma Father     Allergies Father     Colon Cancer Neg     Prostate Cancer Neg      Social History     Occupational History    Not on file   Tobacco Use    Smoking status: Former     Current packs/day: 0.00     Average packs/day: 0.5 packs/day for 6.0 years (3.0 ttl pk-yrs)     Types: Cigarettes     Start date: 11/16/2015     Quit date: 11/16/2021     Years since quitting: 3.2     Passive exposure: Past    Smokeless tobacco: Never    Tobacco comments:     Chews nicotine pouches    Vaping Use    Vaping status: Never Used   Substance and Sexual Activity    Alcohol use: Yes     Alcohol/week: 12.0 standard drinks of alcohol     Types: 12 Cans of beer per week    Drug use: Not Currently     Frequency: 0.5 times per week     Types: Cannabis    Sexual activity: Yes     Partners: Female      Assessment     Review of Systems (negative unless bolded):  General: fevers, chills, fatigue  CV:  chest pain, palpitations, leg swelling  Msk: bodyaches, neck pain, neck stiffness  Skin: rashes, open wounds, nonhealing ulcers  Hem: bleeds  easily, bruise easily, immunocompromised  Neuro: dizziness, light headedness, headaches  Psych: anxious, depressed, anger issues    Adalberto Irene MD   Hand, Wrist, & Elbow Surgery  [email or web address]  t: [phone]  f: [phone]         [1]   Allergies  Allergen Reactions    Dust Mite Extract HIVES     Unknown environmental substance-treated in ED for hives    Fish-Derived Products SHORTNESS OF BREATH    Mold RESPIRATORY FAILURE    Shellfish-Derived Products HIVES    Shellfish OTHER (SEE COMMENTS)     Difficulty breathing

## 2025-03-25 NOTE — PROCEDURES
[address]  [address] [place] [address]  Phone: [phone]  Fax: [phone]    ELECTRODIAGNOSTIC REPORT          Patient: Ana Maria Gamez Hand Dominance:  right   Patient ID: [id number] Referring Dr:  Dr. Adalberto Irene   Sex: Male Test Dr:  Dr. Bud Sanabria   YOB: 1995           Visit Date: 3/25/25 Examining MD:     Age: 29 Years 10 Months Referred by:     Height: 5 feet 10 inch     Referred for: EMG LUE- pt comes in with L arm stabbing/throbbing pain. Admits some T/N in LUE digits 4-5. admits some weakness. Rates pain 0/10 now. Denies thyroid. Denies diabetes. Denies daily alcohol.                Summary    The motor conduction test was performed on 2 nerve(s). The results were normal in 1 nerve(s): L Ulnar - ADM. Results outside the specified normal range were found in 1 nerve(s), as follows:  In the L Median - APB study  the amplitude was reduced for Wrist stimulation    The sensory conduction test was performed on 3 nerve(s). The results were normal in 2 nerve(s): L Ulnar - Digit V (Antidromic), L Radial - Anatomical snuff box (Forearm). Results outside the specified normal range were found in 1 nerve(s), as follows:  In the L Median - Digit II (Antidromic) study  the peak latency was increased for Wrist stimulation    The needle EMG examination was performed in 6 muscles. It was normal in 5 muscle(s): L. Deltoid, L. Biceps brachii, L. Triceps brachii, L. Pronator teres, L. First dorsal interosseous. The study was abnormal in 1 muscle(s), with the following distribution:  The L. Abductor pollicis brevis had abnormal spontaneous activity, abnormal MUP waveforms, abnormal interference pattern.          Conclusion:       This is an abnormal study.    1.  There is electrodiagnostic evidence to support a left-sided median mononeuropathy at or distal to the wrist (carpal tunnel syndrome) which is electrically moderate with active and chronic  denervation changes noted on needle EMG.  2.  There is no evidence to support a left-sided cervical (C5-T1) radiculopathy.  3.  There is no evidence to support a left-sided ulnar mononeuropathy.    Bud Sanabria DO  Interventional Spine and Sports Medicine Specialist   Physical Medicine and Rehabilitation  [facility]  [address] [place] [address]    [facility]  [address] [place] [address]        ________________________________               Motor NCS      Nerve / Sites Muscle Latency Amplitude Amp % Duration Segments Distance Lat Diff Velocity     ms mV % ms  cm ms m/s   L Median - APB      Wrist APB 4.01 1.3 100 9.38 Wrist - APB 8        Ref.  <=4.40 >=4.0 >=100  Ref.         Elbow APB 7.60 2.5 194 8.75 Elbow - Wrist 22 3.59 61      Ref.    >=80  Ref.   >=49   L Ulnar - ADM      Wrist ADM 2.66 6.8 100 6.98 Wrist - ADM 8        Ref.  <=3.70 >=5.0 >=100  Ref.         B.Elbow ADM 6.25 5.8 85.6 7.29 B.Elbow - Wrist 22 3.59 61      Ref.    >=80  Ref.   >=50      A.Elbow ADM 8.23 6.0 88.9 7.24 A.Elbow - B.Elbow 12 1.98 61      Ref.      Ref.   >=50       Sensory NCS      Nerve / Sites Rec. Site Onset Lat Peak Lat NP Amp PP Amp Segments Distance Velocity     ms ms µV µV  cm m/s   L Median - Digit II (Antidromic)      Wrist Dig II 3.28 4.17 23.7 38.8 Wrist - Dig II 13 40      Ref.   <=3.40 >=15.0 >=20.0 Ref.     L Ulnar - Digit V (Antidromic)      Wrist Dig V 2.50 3.33 26.3 20.2 Wrist - Dig V 14 56      Ref.   <=3.70 >=15.0 >=15.0 Ref.     L Radial - Anatomical snuff box (Forearm)      Forearm Wrist 1.88 2.24 24.6 26.4 Forearm - Wrist 10 53      Ref.   <=2.70 >=5.0 >=5.0 Ref.         EMG Summary Table     Spontaneous MUAP Recruitment   Muscle Nerve Roots IA Fib PSW Fasc H.F. Comments Amp Dur. PPP Pattern   L. Deltoid Axillary C5-C6 N None None None None Normal N N N N   L. Biceps brachii Musculocutaneous C5-C6 N None None None None Normal N N N N    L. Triceps brachii Radial C6-C8 N None None None None Normal N N N N   L. Pronator teres Median C6-C7 N None None None None Normal N N N N   L. First dorsal interosseous Ulnar C8-T1 N None None None None Normal N N N N   L. Abductor pollicis brevis Median C8-T1 1+ None None None None 1+ Lg Amp 5-7 N N Reduced

## 2025-03-26 NOTE — PROGRESS NOTES
Clinic Note       Assessment/Plan:  29 year old male    Left carpal tunnel syndrome-EMG/NCV confirmed-tenosynovitis of the FCR tendon could be causing secondary compression to the median nerve in addition to compression of the carpal tunnel.  Both will be addressed.  Patient consented to endoscopic carpal tunnel is possible open  CTR Consent: Non-surgical and surgical treatment options where reviewed with the patient. Patient elected to proceed with surgical decompression of median nerve in the carpal tunnel. Patient consented to surgery after having understood the risks associated with surgery, expected outcomes, time to recovery and the possible need for therapy post-operatively. Risks include but not limited to: infection, wound complication, nerve injury resulting in temporary or permanent nerve damage, finger/hand stiffness, persistent pain/symptoms, swelling, CRPS, recurrence of carpal tunnel syndrome, and need for additional surgery.  Surgery scheduled for 4/3/2025  Left FCR tenosynovitis versus tendon sheath cyst-MRI of the wrist shows tenosynovitis to the FCR insertion site through the FCR tunnel.  Possible mass effect on the FCR tendon  Nicotine dependence     Follow Up: Surgical date    Diagnostic Studies:     EMG/NCV: 1.  There is electrodiagnostic evidence to support a left-sided median mononeuropathy at or distal to the wrist (carpal tunnel syndrome) which is electrically moderate with active and chronic denervation changes noted on needle EMG.    MRI left wrist: FCR tendon without significant evidence of intrinsic degeneration.  Tenosynovitis versus tendon sheath cyst     Physical Exam:     Ht 5' 10\" (1.778 m)   Wt 156 lb (70.8 kg)   BMI 22.38 kg/m²     Left Upper Extremity:     Inspection    Skin intact. No joint effusion.  Swelling mass along the FCR tendon at the distal forearm wrist level Palpation    Tenderness to palpation along the FCR tendon over the cystlike structure      ROM    Full  composite fist. Normal wrist/elbow motion.     Median Nerve Exam   Phdurkan +   Sensation normal   PCBr Sensation normal   APB Weakness No   Thenar Atrophy No   Ulnar Nerve Exam   Tinels neg   EF neg   Hypermobility  neg   Sensation normal   1st APARNA Weakness No   SF Escape No   Hypothenar Atrophy No   Radial Nerve Exam    Radial Sensory Normal        CC: Left wrist bump    HPI: This 29 year old RHD male presents with left wrist lump.  Patient notes pain and loss of sensation in the median nerve distribution.  Patient reports pain can be severe at times.  He describes the pain as sharp and shooting along the FCR tendon.    Occupation: [employer]    Past Medical History:    Allergic rhinitis    Asthma (HCC)     Past Surgical History:   Procedure Laterality Date    Other surgical history  6-8 years ago    Torn labrum on left shoulder. Also torn flexur tendon in left wrist a ear or 2 prior to labrum surgery.    Tonsillectomy  Childhood     Current Outpatient Medications   Medication Sig Dispense Refill    ALPRAZolam 0.25 MG Oral Tab TAKE 1 TABLET(0.25 MG) BY MOUTH EVERY NIGHT AS NEEDED FOR ANXIETY OR SLEEP. MAY CAUSE SEDATION/ FATIGUE 30 tablet 0    Omega-3 Fatty Acids (FISH OIL OR) Take by mouth.      Creatine Does not apply Powder       WELLNESS PROTEIN SHAKE OR Take by mouth.      fluticasone-salmeterol 115-21 MCG/ACT Inhalation Aerosol Inhale 1 puff into the lungs 2 (two) times daily. 1 each 3    EPINEPHrine (EPIPEN 2-ARMEN) 0.3 MG/0.3ML Injection Solution Auto-injector Inject IM in event of  allergic reaction 1 each 0    albuterol (PROAIR HFA) 108 (90 Base) MCG/ACT Inhalation Aero Soln Inhale 2 puffs into the lungs every 6 (six) hours as needed for Wheezing. 1 each 0    levocetirizine 5 MG Oral Tab Take 1 tablet (5 mg total) by mouth every evening. 90 tablet 1    montelukast 10 MG Oral Tab Take 1 tablet (10 mg total) by mouth daily as needed. (Patient not taking: Reported on 3/26/2025)       Allergies[1]  Family  History   Problem Relation Age of Onset    Asthma Father     Allergies Father     Colon Cancer Neg     Prostate Cancer Neg      Social History     Occupational History    Not on file   Tobacco Use    Smoking status: Former     Current packs/day: 0.00     Average packs/day: 0.5 packs/day for 6.0 years (3.0 ttl pk-yrs)     Types: Cigarettes     Start date: 11/16/2015     Quit date: 11/16/2021     Years since quitting: 3.3     Passive exposure: Past    Smokeless tobacco: Never    Tobacco comments:     Chews nicotine pouches    Vaping Use    Vaping status: Never Used   Substance and Sexual Activity    Alcohol use: Yes     Alcohol/week: 12.0 standard drinks of alcohol     Types: 12 Cans of beer per week    Drug use: Not Currently     Frequency: 0.5 times per week     Types: Cannabis    Sexual activity: Yes     Partners: Female      Assessment     Review of Systems (negative unless bolded):  General: fevers, chills, fatigue  CV:  chest pain, palpitations, leg swelling  Msk: bodyaches, neck pain, neck stiffness  Skin: rashes, open wounds, nonhealing ulcers  Hem: bleeds easily, bruise easily, immunocompromised  Neuro: dizziness, light headedness, headaches  Psych: anxious, depressed, anger issues    Luis Irene MD   Hand, Wrist, & Elbow Surgery  [email or web address]  t: [phone]  f: [phone]         [1]   Allergies  Allergen Reactions    Dust Mite Extract HIVES     Unknown environmental substance-treated in ED for hives    Fish-Derived Products SHORTNESS OF BREATH    Mold RESPIRATORY FAILURE    Shellfish-Derived Products HIVES    Shellfish OTHER (SEE COMMENTS)     Difficulty breathing

## 2025-03-26 NOTE — PROGRESS NOTES
SURGICAL BOOKING SHEET   Name: Franco Esqueda  MRN: [id number]   : 5/10/1995    Surgical Date:   4/3/25   Surgical Consent:   Left endoscopic carpal tunnel release, possible open, left wrist FCR tenosynovectomy   Diagnosis:      ICD-10-CM   1. Tenosynovitis of left wrist  M65.932   2. Carpal tunnel syndrome of left wrist  G56.02       Workers Comp: No   Procedure Codes:   Endoscopic carpal tunnel release (CPT 40611)  Tenosynovectomy wrist (26157)   Disposition:   Outpatient   Operative Time:   45 mins   Antibiotics:   Ancef 2g   Anesthesia Type:   Monitored Anesthesia Care (MAC) + Regional - Supraclavicular   Clearance:    None   Equipment:   ECTR: [place] Blade, Microaire Endoscopic System, Local Pre-Mix (1% lidocaine w/ epi, 0.5% marcaine, and 8.4% NaBicarb) , 5-0 moncryl, Exofin, Telfa+Tegaderm   Standby: Lead Hand, 4-0 Nylon PS-2   Assistant:   Teto Assistant: Yes, Surgical Assist    Follow Up:   10-14 days post op with me   Pain Medication:   Pain protocol    Therapy:   [facility]

## 2025-03-27 NOTE — TELEPHONE ENCOUNTER
Date of Surgery: 2025     Post Op Appt: 2025 1015AM    Case ID: [id number]     Notes:     SURGICAL BOOKING SHEET   Name: Franco Esqueda  MRN: [id number]   : 5/10/1995     Surgical Date:    4/3/25   Surgical Consent:    Left endoscopic carpal tunnel release, possible open, left wrist FCR tenosynovectomy   Diagnosis:         ICD-10-CM   1. Tenosynovitis of left wrist  M65.932   2. Carpal tunnel syndrome of left wrist  G56.02       Workers Comp: No   Procedure Codes:    Endoscopic carpal tunnel release (CPT 16560)  Tenosynovectomy wrist (50277)   Disposition:    Outpatient   Operative Time:    45 mins   Antibiotics:    Ancef 2g   Anesthesia Type:    Monitored Anesthesia Care (MAC) + Regional - Supraclavicular   Clearance:     None   Equipment:    ECTR: [place] Blade, Microaire Endoscopic System, Local Pre-Mix (1% lidocaine w/ epi, 0.5% marcaine, and 8.4% NaBicarb) , 5-0 moncryl, Exofin, Telfa+Tegaderm   Standby: Lead Hand, 4-0 Nylon PS-2   Assistant:    Teto Assistant: Yes, Surgical Assist    Follow Up:    10-14 days post op with me   Pain Medication:    Pain protocol    Therapy:    [facility]

## 2025-04-03 NOTE — ANESTHESIA PREPROCEDURE EVALUATION
PRE-OP EVALUATION    Patient Name: Franco Esqueda    Admit Diagnosis: Tenosynovitis of left wrist [M65.932]  Carpal tunnel syndrome of left wrist [G56.02]    Pre-op Diagnosis: Tenosynovitis of left wrist [M65.932]  Carpal tunnel syndrome of left wrist [G56.02]    Left endoscopic carpal tunnel release, possible open, left wrist flexor carpi radialis tenosynovectomy    Anesthesia Procedure: Left endoscopic carpal tunnel release, possible open, left wrist flexor carpi radialis tenosynovectomy (Left)    Surgeons and Role:     * Adalberto Irene MD - Primary    Pre-op vitals reviewed.  Temp: 97.8 °F (36.6 °C)  Pulse: 57  Resp: 16  BP: 112/65  SpO2: 98 %  Body mass index is 21.52 kg/m².    Current medications reviewed.  Hospital Medications:   acetaminophen (Tylenol Extra Strength) tab 1,000 mg  1,000 mg Oral Once    scopolamine (Transderm-Scop) 1 MG/3DAYS patch 1 patch  1 patch Transdermal Once    lactated ringers infusion   Intravenous Continuous    ceFAZolin (Ancef) 2g in 10mL IV syringe premix  2 g Intravenous Once    bupivacaine/lidocaine w Epi/sodium bicarbonate local mixture 10mL syringe   Infiltration Once (Intra-Op)       Outpatient Medications:   Prescriptions Prior to Admission[1]    Allergies: Dust mite extract, Fish-derived products, Mold, Shellfish-derived products, and Shellfish      Anesthesia Evaluation        Anesthetic Complications           GI/Hepatic/Renal    Negative GI/hepatic/renal ROS.         (-) chronic renal disease   (-) liver disease                 Cardiovascular    Negative cardiovascular ROS.    Exercise tolerance: good     MET: >4                                           Endo/Other    Negative endo/other ROS.  (-) diabetes     (-) hypothyroidism  (-) hyperthyroidism                     Pulmonary      (+) asthma       (-) pneumonia    (-) recent URI   (-) sleep apnea       Neuro/Psych    Negative neuro/psych ROS.      (-) CVA     (-) seizures                       Past Surgical  History:   Procedure Laterality Date    Other surgical history  6-8 years ago    Torn labrum on left shoulder. Also torn flexur tendon in left wrist a ear or 2 prior to labrum surgery.    Other surgical history      approx 8 years ago - right flexor tendon repair    Tonsillectomy  Childhood     Social History     Socioeconomic History    Marital status: OTHER   Tobacco Use    Smoking status: Former     Current packs/day: 0.00     Average packs/day: 0.5 packs/day for 6.0 years (3.0 ttl pk-yrs)     Types: Cigarettes     Start date: 11/16/2015     Quit date: 11/16/2021     Years since quitting: 3.3     Passive exposure: Past    Smokeless tobacco: Never    Tobacco comments:     Chews nicotine pouches    Vaping Use    Vaping status: Some Days    Substances: Nicotine   Substance and Sexual Activity    Alcohol use: Yes     Alcohol/week: 12.0 standard drinks of alcohol     Types: 12 Cans of beer per week    Drug use: Not Currently     Frequency: 0.5 times per week     Types: Cannabis    Sexual activity: Yes     Partners: Female     History   Drug Use Unknown     Available pre-op labs reviewed.               Airway      Mallampati: I  Mouth opening: 3 FB  TM distance: 4 - 6 cm  Neck ROM: full Cardiovascular    Cardiovascular exam normal.         Dental    Dentition appears grossly intact         Pulmonary    Pulmonary exam normal.                 Other findings              ASA: 2   Plan: MAC  NPO status verified and patient meets guidelines.    Post-procedure pain management plan discussed with surgeon and patient.      Plan/risks discussed with: patient                Present on Admission:  **None**             [1]   Medications Prior to Admission   Medication Sig Dispense Refill Last Dose/Taking    Omega-3 Fatty Acids (FISH OIL OR) Take by mouth.   3/27/2025    Creatine Does not apply Powder    Taking    albuterol (PROAIR HFA) 108 (90 Base) MCG/ACT Inhalation Aero Soln Inhale 2 puffs into the lungs every 6 (six) hours as  needed for Wheezing. 1 each 0 Taking As Needed    ALPRAZolam 0.25 MG Oral Tab TAKE 1 TABLET(0.25 MG) BY MOUTH EVERY NIGHT AS NEEDED FOR ANXIETY OR SLEEP. MAY CAUSE SEDATION/ FATIGUE (Patient not taking: Reported on 3/27/2025) 30 tablet 0 Not Taking    fluticasone-salmeterol 115-21 MCG/ACT Inhalation Aerosol Inhale 1 puff into the lungs 2 (two) times daily. (Patient not taking: Reported on 3/27/2025) 1 each 3 Not Taking    EPINEPHrine (EPIPEN 2-ARMEN) 0.3 MG/0.3ML Injection Solution Auto-injector Inject IM in event of  allergic reaction 1 each 0     montelukast 10 MG Oral Tab Take 1 tablet (10 mg total) by mouth daily as needed. (Patient not taking: Reported on 3/26/2025)       levocetirizine 5 MG Oral Tab Take 1 tablet (5 mg total) by mouth every evening. 90 tablet 1

## 2025-04-03 NOTE — ANESTHESIA POSTPROCEDURE EVALUATION
[facility]    Franco Esqueda Patient Status:  Hospital Outpatient Surgery   Age/Gender 29 year old male MRN [id number]   Location [facility] SURGERY Attending Adalberto Irene MD   Hosp Day # 0 PCP Karolyn Lassiter MD       Anesthesia Post-op Note    Left endoscopic carpal tunnel release, possible open, left wrist flexor carpi radialis tenosynovectomy    Procedure Summary       Date: 04/03/25 Room / Location:  MAIN OR 06 /  MAIN OR    Anesthesia Start: 0716 Anesthesia Stop: 0850    Procedure: Left endoscopic carpal tunnel release, possible open, left wrist flexor carpi radialis tenosynovectomy (Left: Wrist) Diagnosis:       Tenosynovitis of left wrist      Carpal tunnel syndrome of left wrist      (Tenosynovitis of left wrist [M65.932]Carpal tunnel syndrome of left wrist [G56.02])    Surgeons: Adalberto Irene MD Anesthesiologist: Vickey Alcazar MD    Anesthesia Type: MAC ASA Status: 2            Anesthesia Type: MAC    Vitals Value Taken Time   BP 94/46 04/03/25 0851   Temp 97.6 04/03/25 0851   Pulse 76 04/03/25 0851   Resp 10 04/03/25 0851   SpO2 100 04/03/25 0851           Patient Location: Same Day Surgery    Anesthesia Type: MAC    Airway Patency: patent    Postop Pain Control: adequate    Mental Status: preanesthetic baseline    Nausea/Vomiting: none    Cardiopulmonary/Hydration status: stable euvolemic    Complications: no apparent anesthesia related complications    Postop vital signs: stable    Dental Exam: Unchanged from Preop    Patient to be discharged from PACU when criteria met.

## 2025-04-03 NOTE — H&P
Clinic Note       Assessment/Plan:  29 year old male    Left carpal tunnel syndrome-EMG/NCV confirmed-tenosynovitis of the FCR tendon could be causing secondary compression to the median nerve in addition to compression of the carpal tunnel.  Both will be addressed.  Patient consented to endoscopic carpal tunnel is possible open  CTR Consent: Non-surgical and surgical treatment options where reviewed with the patient. Patient elected to proceed with surgical decompression of median nerve in the carpal tunnel. Patient consented to surgery after having understood the risks associated with surgery, expected outcomes, time to recovery and the possible need for therapy post-operatively. Risks include but not limited to: infection, wound complication, nerve injury resulting in temporary or permanent nerve damage, finger/hand stiffness, persistent pain/symptoms, swelling, CRPS, recurrence of carpal tunnel syndrome, and need for additional surgery.  Surgery scheduled for 4/3/2025  Left FCR tenosynovitis versus tendon sheath cyst-MRI of the wrist shows tenosynovitis to the FCR insertion site through the FCR tunnel.  Possible mass effect on the FCR tendon  Nicotine dependence     Follow Up: Surgical date    Diagnostic Studies:     EMG/NCV: 1.  There is electrodiagnostic evidence to support a left-sided median mononeuropathy at or distal to the wrist (carpal tunnel syndrome) which is electrically moderate with active and chronic denervation changes noted on needle EMG.    MRI left wrist: FCR tendon without significant evidence of intrinsic degeneration.  Tenosynovitis versus tendon sheath cyst     Physical Exam:     Ht 5' 10\" (1.778 m)   Wt 155 lb (70.3 kg)   BMI 22.24 kg/m²     Left Upper Extremity:     Inspection    Skin intact. No joint effusion.  Swelling mass along the FCR tendon at the distal forearm wrist level Palpation    Tenderness to palpation along the FCR tendon over the cystlike structure      ROM    Full  composite fist. Normal wrist/elbow motion.     Median Nerve Exam   Phdurkan +   Sensation normal   PCBr Sensation normal   APB Weakness No   Thenar Atrophy No   Ulnar Nerve Exam   Tinels neg   EF neg   Hypermobility  neg   Sensation normal   1st APARNA Weakness No   SF Escape No   Hypothenar Atrophy No   Radial Nerve Exam    Radial Sensory Normal        CC: Left wrist bump    HPI: This 29 year old RHD male presents with left wrist lump.  Patient notes pain and loss of sensation in the median nerve distribution.  Patient reports pain can be severe at times.  He describes the pain as sharp and shooting along the FCR tendon.    Occupation: [employer]    Past Medical History:    Allergic rhinitis    Anesthesia complication    patient had difficulty breathing while under twilight sedation and was given general anesthesia, woke up during procedure    Asthma (HCC)    Depression     Past Surgical History:   Procedure Laterality Date    Other surgical history  6-8 years ago    Torn labrum on left shoulder. Also torn flexur tendon in left wrist a ear or 2 prior to labrum surgery.    Other surgical history      approx 8 years ago - right flexor tendon repair    Tonsillectomy  Childhood     Current Outpatient Medications   Medication Sig Dispense Refill    Omega-3 Fatty Acids (FISH OIL OR) Take by mouth.      Creatine Does not apply Powder       albuterol (PROAIR HFA) 108 (90 Base) MCG/ACT Inhalation Aero Soln Inhale 2 puffs into the lungs every 6 (six) hours as needed for Wheezing. 1 each 0    ALPRAZolam 0.25 MG Oral Tab TAKE 1 TABLET(0.25 MG) BY MOUTH EVERY NIGHT AS NEEDED FOR ANXIETY OR SLEEP. MAY CAUSE SEDATION/ FATIGUE (Patient not taking: Reported on 3/27/2025) 30 tablet 0    fluticasone-salmeterol 115-21 MCG/ACT Inhalation Aerosol Inhale 1 puff into the lungs 2 (two) times daily. (Patient not taking: Reported on 3/27/2025) 1 each 3    EPINEPHrine (EPIPEN 2-ARMEN) 0.3 MG/0.3ML Injection Solution Auto-injector Inject IM in event  of  allergic reaction 1 each 0    montelukast 10 MG Oral Tab Take 1 tablet (10 mg total) by mouth daily as needed. (Patient not taking: Reported on 3/26/2025)      levocetirizine 5 MG Oral Tab Take 1 tablet (5 mg total) by mouth every evening. 90 tablet 1     Allergies[1]  Family History   Problem Relation Age of Onset    Asthma Father     Allergies Father     Colon Cancer Neg     Prostate Cancer Neg      Social History     Occupational History    Not on file   Tobacco Use    Smoking status: Former     Current packs/day: 0.00     Average packs/day: 0.5 packs/day for 6.0 years (3.0 ttl pk-yrs)     Types: Cigarettes     Start date: 11/16/2015     Quit date: 11/16/2021     Years since quitting: 3.3     Passive exposure: Past    Smokeless tobacco: Never    Tobacco comments:     Chews nicotine pouches    Vaping Use    Vaping status: Some Days    Substances: Nicotine   Substance and Sexual Activity    Alcohol use: Yes     Alcohol/week: 12.0 standard drinks of alcohol     Types: 12 Cans of beer per week    Drug use: Not Currently     Frequency: 0.5 times per week     Types: Cannabis    Sexual activity: Yes     Partners: Female      Assessment     Review of Systems (negative unless bolded):  General: fevers, chills, fatigue  CV:  chest pain, palpitations, leg swelling  Msk: bodyaches, neck pain, neck stiffness  Skin: rashes, open wounds, nonhealing ulcers  Hem: bleeds easily, bruise easily, immunocompromised  Neuro: dizziness, light headedness, headaches  Psych: anxious, depressed, anger issues    Adalberto Irene MD   Hand, Wrist, & Elbow Surgery  [email or web address]  t: [phone]  f: [phone]         [1]   Allergies  Allergen Reactions    Dust Mite Extract HIVES     Unknown environmental substance-treated in ED for hives    Fish-Derived Products SHORTNESS OF BREATH    Mold RESPIRATORY FAILURE    Shellfish-Derived Products HIVES    Shellfish OTHER (SEE COMMENTS)     Difficulty breathing

## 2025-04-03 NOTE — ANESTHESIA PROCEDURE NOTES
Regional Block    Performed by: Vickey Alcazar MD  Authorized by: Vickey Alcazar MD      General Information and Staff    Start Time:   Anesthesiologist:  Vickey Alcazar MD  Performed by:  Anesthesiologist  Patient Location:  OR      Site Identification: real time ultrasound guided and image stored and retrievable    Block site/laterality marked before start: site marked  Reason for Block: at surgeon's request and post-op pain management    Preanesthetic Checklist: 2 patient identifers, IV checked, risks and benefits discussed, monitors and equipment checked, pre-op evaluation, timeout performed, anesthesia consent, sterile technique used, no prohibitive neurological deficits and no local skin infection at insertion site      Procedure Details    Patient Position:   Prep: ChloraPrep    Monitoring:  Cardiac monitor, continuous pulse ox, blood pressure cuff and heart rate  Block Type:  Supraclavicular  Laterality:  Left  Injection Technique:  Single-shot    Needle    Needle Type:  Short-bevel and echogenic  Needle Gauge:  22 G  Needle Length:  50 mm  Needle Localization:  Ultrasound guidance  Reason for Ultrasound Use: appropriate spread of the medication was noted in real time and no ultrasound evidence of intravascular and/or intraneural injection            Assessment    Injection Assessment:  Good spread noted, negative resistance, negative aspiration for heme, incremental injection and low pressure  Heart Rate Change: No    - Patient tolerated block procedure well without evidence of immediate block related complications.     Medications      Additional Comments    Medication:  Bupivacaine 0.5% 20mL

## 2025-04-03 NOTE — DISCHARGE INSTRUCTIONS
What to expect after a carpal tunnel release/ tendon tenosynovectomy    Immediately after your surgery, keep your hand elevated (fingers up pointing to ceiling) as much as possible for the first 7 days. Icing is good too, but not as important as elevation. It is normal for your fingers to swell and have discoloration (bruising) appear a couple days after surgery into your fingers or elbow. You should begin using your hand for light activities - writing, typing, dressing and feeding yourself immediately. Move your fingers and make a fist ten times every hour while awake. Refrain from lifting greater than 2 pounds for the first 2 weeks after your surgery to allow the surgical site to heal completely.    Patient’s pain level is variable after this procedure most patients average a 2-3 out of 10 on a pain scale.. After the numbness wears off you can take pain medication as needed, including an anti-inflammatory (i.e. Aleve, Motrin, ibuprofen) as long as you don’t have any contraindications to taking these.    You can remove the dressing 5-7 days after your surgery. After the dressing comes off, we recommend you continue to cover the wound with a band aid when in a “dirty” environment (work, out in public). Please keep your wound dry until the post op appointment with us to have stitches removed. You will need to cover the hand with a bag when you want to take a shower. Please refrain from soaking your hand in a bath or dishwater for 3 weeks following your surgery.    You will see Jasmina Murillo PA-C or Dr Irene at your post op visit approximately 10-14 days after surgery to have your stitches removed. An appointment was made for you but if you do not have an appointment or that time does not work please call the office     If you have any questions or concerns please reach out [phone].

## 2025-04-03 NOTE — OPERATIVE REPORT
Operative Report     Patient Name: Franco Esqueda    4/3/2025    Preoperative Diagnosis:     Tenosynovitis of left wrist [M65.932]  Carpal tunnel syndrome of left wrist [G56.02]    Postoperative Diagnosis:     Same as above    Surgeons and Role:     * Adalberto Irene MD - Primary     Assistant: Marcus Burk SA    A SA was needed for the successful completion of this case. She was essential for the proper positioning of patient, manipulation of instruments, proper exposure, manipulation of soft tissue, and wound closure.    Procedures:     Left endoscopic carpal tunnel release (CPT 85868)  Complete synovectomy of left wrist FCR tendon (CPT 06177)    Antibiotics: Ancef 2g    Surgical Findings: synovitis FCR tendon.     Anesthesia: Regional + Local    Complications: None    Condition: Stable    Estimated Blood Loss: 1mL    Indications:  29 year old male with EMG/NCV confirmed left carpal tunnel syndrome and FCR tenosynovitis that failed non-surgical management. Patient consented to an endoscopic carpal tunnel release possible open having understood the risks associated with surgery, expected outcome, time to recovery and the need for possible rehab.  Risks that were discussed but not limited to infection, nerve injury, tendon injury, artery injury, need for additional surgery, and no improvements of present symptoms.      Procedure:  Patient was met in the preoperative holding area where consent was verified, laterality was marked with the surgeon's initials, and the H&P was updated. Local anesthetic was injected. Patient was brought to the operating room on a transport cart. Patient was then transferred onto the operating room table and placed in supine position with an arm table and with bony prominences well-padded.  SCDs were placed.  Antibiotics were fully infused. An upper arm tourniquet was placed and the limb was exsanguinated using Esmarch bandage. The arm was then prepped and draped in the usual  sterile fashion. A surgical timeout was performed.Tourniquet was raised to 250mmHg.    A transverse incision through the dermis was made 5mm proximal to the distal volar wrist crease just ulnar to the palmaris longus using a 15 blade.  Adson's and Sierra scissors was used to dissect through the subcutaneous tissue onto the antebrachial fascia.  A distally based 1 cm wide rectangular flap was elevated using a Henderson blade.  The flap was retracted distally and volarly allowing access to the carpal tunnel.  The undersurface of the transverse carpal ligament was accessed, cleaned, and dilated.  After assessing the width of the ligament, the e-contratose endoscopic apparatus with camera was inserted into the carpal tunnel.  Under camera magnification with direct visualization of the undersurface of the transverse carpal ligament, the blade was engaged at the distal extents of the ligament and the release of the ligament was carried out distal to proximal.  I verified the release with the endoscopy camera noting divergent edges of the transverse carpal ligament.  I also physically verified with a tenosynovium elevator complete release of the transverse carpal ligament.      15 blade was used to make an incision over the FCR tendon utilizing the previous incision site.  The incision was carried the dermis.  Adson Sierra scissors were used dissect through the subcutaneous tissue down to the flexor tendon sheath.  Bipolar cautery was used to cauterize small vessels.  The tenosynovitis was identified.  It was carefully circumferentially dissected off the surrounding soft tissue structures.  The dissection was carried all the way to the distal aspect of the trapezium bone which correlated to the end of the tenosynovium noted on MRI.  The tenosynovium was excised in its entirety and sent to pathology for evaluation.  The FCR tendon was largely noted to be normal with minimal degenerative changes or fraying.    The tourniquet was  then lowered and bipolar cautery was used to achieve hemostasis.       Closure:  The endoscopic carpal tunnel incision was closed using 5-0 Monocryl in a buried simple interrupted fashion.  3-0 and 4-0 Monocryl was used to close the wrist incision.  Skin glue and Steri-Strips were placed.     Dressing/Splint:  Gauze and web roll was applied to hold the dressing in place.  Ace wrap was overwrapped.  Patient's arm was placed in a sling.    Post Operative:  Patient was woken up from anesthesia and taken to PACU for further recovery and discharge home.    Adalberto Irene MD   Hand, Wrist, & Elbow Surgery  [email or web address]  t: [phone]  f: [phone]

## 2025-04-03 NOTE — BRIEF OP NOTE
Pre-Operative Diagnosis: Tenosynovitis of left wrist [M65.932]  Carpal tunnel syndrome of left wrist [G56.02]     Post-Operative Diagnosis: Tenosynovitis of left wrist [M65.932]Carpal tunnel syndrome of left wrist [G56.02]      Procedure Performed:   Left endoscopic carpal tunnel release, possible open, left wrist flexor carpi radialis tenosynovectomy    Surgeons and Role:     * Adalberto Irene MD - Primary    Assistant(s):        Surgical Findings: tenosynovitis     Specimen: cyst vs tenosynovium     Estimated Blood Loss: Blood Output: 5 mL (4/3/2025  8:23 AM)      Dictation Number:  none    Adalberto Irene MD  4/3/2025  8:41 AM

## 2025-04-16 NOTE — PROGRESS NOTES
Clinic Note       Assessment/Plan:  29 year old male    Status post left endoscopic carpal tunnel release and synovial cyst excision on 4/3/2025-patient is doing fairly well with early minimal range of motion deficit.  He should continue to increase activities as tolerated.  No weightbearing restrictions at this point.  Please with the resolution of the numbness and tingling that he was having preoperatively.  Nicotine dependence     Follow Up: 6 weeks    Diagnostic Studies:     EMG/NCV: 1.  There is electrodiagnostic evidence to support a left-sided median mononeuropathy at or distal to the wrist (carpal tunnel syndrome) which is electrically moderate with active and chronic denervation changes noted on needle EMG.    MRI left wrist: FCR tendon without significant evidence of intrinsic degeneration.  Tenosynovitis versus tendon sheath cyst     Physical Exam:     Ht 5' 10\" (1.778 m)   Wt 150 lb (68 kg)   BMI 21.52 kg/m²     Left Upper Extremity:     Inspection    Skin intact. No joint effusion.  Swelling mass along the FCR tendon at the distal forearm wrist level Palpation    Tenderness to palpation along the FCR tendon over the cystlike structure      ROM    Full composite fist. Normal wrist/elbow motion.     Median Nerve Exam   Phdurkan -   Sensation normal   PCBr Sensation normal   APB Weakness No   Thenar Atrophy No   Ulnar Nerve Exam   Tinels neg   EF neg   Hypermobility  neg   Sensation normal   1st APARNA Weakness No   SF Escape No   Hypothenar Atrophy No   Radial Nerve Exam    Radial Sensory Normal        CC: Left wrist bump    HPI: This 29 year old RHD male presents with left wrist lump.  Patient notes pain and loss of sensation in the median nerve distribution.  Patient reports pain can be severe at times.  He describes the pain as sharp and shooting along the FCR tendon.    Interval Hx (4/16/2025): Patient reports numbness and tingling is resolved.  Currently taking ibuprofen and icing.  Mild discomfort  overall.    Occupation: [employer]    Past Medical History:    Allergic rhinitis    Anesthesia complication    patient had difficulty breathing while under twilight sedation and was given general anesthesia, woke up during procedure    Asthma (HCC)    Depression     Past Surgical History:   Procedure Laterality Date    Other surgical history  6-8 years ago    Torn labrum on left shoulder. Also torn flexur tendon in left wrist a ear or 2 prior to labrum surgery.    Other surgical history      approx 8 years ago - right flexor tendon repair    Tonsillectomy  Childhood     Current Outpatient Medications   Medication Sig Dispense Refill    Omega-3 Fatty Acids (FISH OIL OR) Take by mouth.      Creatine Does not apply Powder       EPINEPHrine (EPIPEN 2-ARMEN) 0.3 MG/0.3ML Injection Solution Auto-injector Inject IM in event of  allergic reaction 1 each 0    albuterol (PROAIR HFA) 108 (90 Base) MCG/ACT Inhalation Aero Soln Inhale 2 puffs into the lungs every 6 (six) hours as needed for Wheezing. 1 each 0    levocetirizine 5 MG Oral Tab Take 1 tablet (5 mg total) by mouth every evening. 90 tablet 1    oxyCODONE 5 MG Oral Tab Take 0.5 tablets (2.5 mg total) by mouth every 4 (four) hours as needed for Pain. (Patient not taking: Reported on 4/16/2025) 20 tablet 0    ALPRAZolam 0.25 MG Oral Tab TAKE 1 TABLET(0.25 MG) BY MOUTH EVERY NIGHT AS NEEDED FOR ANXIETY OR SLEEP. MAY CAUSE SEDATION/ FATIGUE (Patient not taking: Reported on 4/16/2025) 30 tablet 0    fluticasone-salmeterol 115-21 MCG/ACT Inhalation Aerosol Inhale 1 puff into the lungs 2 (two) times daily. (Patient not taking: Reported on 4/16/2025) 1 each 3    montelukast 10 MG Oral Tab Take 1 tablet (10 mg total) by mouth daily as needed. (Patient not taking: Reported on 4/16/2025)       Allergies[1]  Family History   Problem Relation Age of Onset    Asthma Father     Allergies Father     Colon Cancer Neg     Prostate Cancer Neg      Social History     Occupational History     Not on file   Tobacco Use    Smoking status: Former     Current packs/day: 0.00     Average packs/day: 0.5 packs/day for 6.0 years (3.0 ttl pk-yrs)     Types: Cigarettes     Start date: 11/16/2015     Quit date: 11/16/2021     Years since quitting: 3.4     Passive exposure: Past    Smokeless tobacco: Never    Tobacco comments:     Chews nicotine pouches    Vaping Use    Vaping status: Some Days    Substances: Nicotine   Substance and Sexual Activity    Alcohol use: Yes     Alcohol/week: 12.0 standard drinks of alcohol     Types: 12 Cans of beer per week    Drug use: Not Currently     Frequency: 0.5 times per week     Types: Cannabis    Sexual activity: Yes     Partners: Female      Assessment     Review of Systems (negative unless bolded):  General: fevers, chills, fatigue  CV:  chest pain, palpitations, leg swelling  Msk: bodyaches, neck pain, neck stiffness  Skin: rashes, open wounds, nonhealing ulcers  Hem: bleeds easily, bruise easily, immunocompromised  Neuro: dizziness, light headedness, headaches  Psych: anxious, depressed, anger issues    Adalberto Irene MD   Hand, Wrist, & Elbow Surgery  [email or web address]  t: [phone]  f: [phone]         [1]   Allergies  Allergen Reactions    Dust Mite Extract HIVES     Unknown environmental substance-treated in ED for hives    Fish-Derived Products SHORTNESS OF BREATH    Mold RESPIRATORY FAILURE    Shellfish-Derived Products HIVES    Shellfish OTHER (SEE COMMENTS)     Difficulty breathing

## 2025-05-28 NOTE — PROGRESS NOTES
Clinic Note       Assessment/Plan:  30 year old male    Status post left endoscopic carpal tunnel release and synovial cyst excision on 4/3/2025-patient has done fairly well with motion recovery.  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  Swelling and intermittent pain will continue to improve over time.  Clicking the wrist is likely emanating from the wrist joint and is not the flexor carpi radialis tendon.  Patient is doing well enough where further follow-up is not necessary and he is okay to cancel in 6 to 8 weeks if doing well  Nicotine dependence     Follow Up: 6-8 weeks if there is any issues otherwise can cancel if doing well    Diagnostic Studies:     EMG/NCV: 1.  There is electrodiagnostic evidence to support a left-sided median mononeuropathy at or distal to the wrist (carpal tunnel syndrome) which is electrically moderate with active and chronic denervation changes noted on needle EMG.    MRI left wrist: FCR tendon without significant evidence of intrinsic degeneration.  Tenosynovitis versus tendon sheath cyst     Physical Exam:     Ht 5' 10\" (1.778 m)   Wt 150 lb (68 kg)   BMI 21.52 kg/m²     Left Upper Extremity:     Inspection    Skin incision well-healed with minimal scar hypertrophy.  Mild soft tissue swelling around the surgical site Palpation    No significant tenderness around the incision site.      ROM    Full composite fist. Normal wrist/elbow motion.  No gross carpal instability but clicking noted coming from the wrist joint.  The FCR tendon did not show any evidence of subluxation with wrist motion.     Median Nerve Exam   Phdurkan -   Sensation normal   PCBr Sensation normal   APB Weakness No   Thenar Atrophy No   Ulnar Nerve Exam   Tinels neg   EF neg   Hypermobility  neg   Sensation normal   1st APARNA Weakness No   SF Escape No   Hypothenar Atrophy No   Radial Nerve Exam     Radial Sensory Normal        CC: Left wrist bump    HPI: This 30 year old RHD male presents with left wrist lump.  Patient notes pain and loss of sensation in the median nerve distribution.  Patient reports pain can be severe at times.  He describes the pain as sharp and shooting along the FCR tendon.    Interval Hx (4/16/2025): Patient reports numbness and tingling is resolved.  Currently taking ibuprofen and icing.  Mild discomfort overall.    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  He does note some clicking in the wrist unsure if that is the tendon or something else    Occupation: [employer]    Past Medical History:    Allergic rhinitis    Anesthesia complication    patient had difficulty breathing while under twilight sedation and was given general anesthesia, woke up during procedure    Asthma (HCC)    Depression     Past Surgical History:   Procedure Laterality Date    Other surgical history  6-8 years ago    Torn labrum on left shoulder. Also torn flexur tendon in left wrist a ear or 2 prior to labrum surgery.    Other surgical history      approx 8 years ago - right flexor tendon repair    Tonsillectomy  Childhood     Current Outpatient Medications   Medication Sig Dispense Refill    Omega-3 Fatty Acids (FISH OIL OR) Take by mouth.      Creatine Does not apply Powder       EPINEPHrine (EPIPEN 2-ARMEN) 0.3 MG/0.3ML Injection Solution Auto-injector Inject IM in event of  allergic reaction 1 each 0    albuterol (PROAIR HFA) 108 (90 Base) MCG/ACT Inhalation Aero Soln Inhale 2 puffs into the lungs every 6 (six) hours as needed for Wheezing. 1 each 0    levocetirizine 5 MG Oral Tab Take 1 tablet (5 mg total) by mouth every evening. 90 tablet 1    oxyCODONE 5 MG Oral Tab Take 0.5 tablets (2.5 mg total) by mouth every 4 (four) hours as needed for Pain. (Patient not taking: Reported on 5/28/2025) 20 tablet 0     ALPRAZolam 0.25 MG Oral Tab TAKE 1 TABLET(0.25 MG) BY MOUTH EVERY NIGHT AS NEEDED FOR ANXIETY OR SLEEP. MAY CAUSE SEDATION/ FATIGUE (Patient not taking: Reported on 5/28/2025) 30 tablet 0    fluticasone-salmeterol 115-21 MCG/ACT Inhalation Aerosol Inhale 1 puff into the lungs 2 (two) times daily. (Patient not taking: Reported on 5/28/2025) 1 each 3    montelukast 10 MG Oral Tab Take 1 tablet (10 mg total) by mouth daily as needed. (Patient not taking: Reported on 5/28/2025)       Allergies[1]  Family History   Problem Relation Age of Onset    Asthma Father     Allergies Father     Colon Cancer Neg     Prostate Cancer Neg      Social History     Occupational History    Not on file   Tobacco Use    Smoking status: Former     Current packs/day: 0.00     Average packs/day: 0.5 packs/day for 6.0 years (3.0 ttl pk-yrs)     Types: Cigarettes     Start date: 11/16/2015     Quit date: 11/16/2021     Years since quitting: 3.5     Passive exposure: Past    Smokeless tobacco: Never    Tobacco comments:     Chews nicotine pouches    Vaping Use    Vaping status: Some Days    Substances: Nicotine   Substance and Sexual Activity    Alcohol use: Yes     Alcohol/week: 12.0 standard drinks of alcohol     Types: 12 Cans of beer per week    Drug use: Not Currently     Frequency: 0.5 times per week     Types: Cannabis    Sexual activity: Yes     Partners: Female      Assessment     Review of Systems (negative unless bolded):  General: fevers, chills, fatigue  CV:  chest pain, palpitations, leg swelling  Msk: bodyaches, neck pain, neck stiffness  Skin: rashes, open wounds, nonhealing ulcers  Hem: bleeds easily, bruise easily, immunocompromised  Neuro: dizziness, light headedness, headaches  Psych: anxious, depressed, anger issues    Luis Irene MD   Hand, Wrist, [facility] Elbow Surgery  [email or web address]  t: [phone]  f: [phone]         [1]   Allergies  Allergen Reactions    Dust Mite Extract HIVES     Unknown environmental  substance-treated in ED for hives    Fish-Derived Products SHORTNESS OF BREATH    Mold RESPIRATORY FAILURE    Shellfish-Derived Products HIVES    Shellfish OTHER (SEE COMMENTS)     Difficulty breathing

## (undated) NOTE — LETTER
:  5/10/1995      To Whom It May Concern: This patient was seen in our office on 23 . He should avoid heavy lifting >10lbs, straining, bending, heavy activity for 3-7 days. If this office may be of further assistance, please do not hesitate to contact us.       Sincerely,        Momo Quan MD